# Patient Record
Sex: MALE | Race: WHITE
[De-identification: names, ages, dates, MRNs, and addresses within clinical notes are randomized per-mention and may not be internally consistent; named-entity substitution may affect disease eponyms.]

---

## 2019-04-29 ENCOUNTER — HOSPITAL ENCOUNTER (OUTPATIENT)
Dept: HOSPITAL 95 - ORSCSDS | Age: 79
Discharge: HOME | End: 2019-04-29
Attending: INTERNAL MEDICINE
Payer: MEDICARE

## 2019-04-29 VITALS — WEIGHT: 210.81 LBS | BODY MASS INDEX: 28.55 KG/M2 | HEIGHT: 72.01 IN

## 2019-04-29 DIAGNOSIS — Z79.82: ICD-10-CM

## 2019-04-29 DIAGNOSIS — D12.5: ICD-10-CM

## 2019-04-29 DIAGNOSIS — I10: ICD-10-CM

## 2019-04-29 DIAGNOSIS — G20: ICD-10-CM

## 2019-04-29 DIAGNOSIS — Z87.891: ICD-10-CM

## 2019-04-29 DIAGNOSIS — Z80.0: ICD-10-CM

## 2019-04-29 DIAGNOSIS — K57.30: ICD-10-CM

## 2019-04-29 DIAGNOSIS — Z79.899: ICD-10-CM

## 2019-04-29 DIAGNOSIS — Z86.010: ICD-10-CM

## 2019-04-29 DIAGNOSIS — Z12.11: Primary | ICD-10-CM

## 2019-04-29 PROCEDURE — 0DBH8ZX EXCISION OF CECUM, VIA NATURAL OR ARTIFICIAL OPENING ENDOSCOPIC, DIAGNOSTIC: ICD-10-PCS | Performed by: INTERNAL MEDICINE

## 2019-04-29 PROCEDURE — 0DBN8ZX EXCISION OF SIGMOID COLON, VIA NATURAL OR ARTIFICIAL OPENING ENDOSCOPIC, DIAGNOSTIC: ICD-10-PCS | Performed by: INTERNAL MEDICINE

## 2021-09-21 NOTE — NUR
DOCTOR LESTER NOTIFIED OF GRAM - BACILLI IN BLOOD CX, AND OF REPEAT LACTIC 5.3,
AND REPEAT H&H. CONTINUE CURRENT ANTIBIOTICS AND GIVE ANOTHER LITER BOLUS OF
NS.

## 2021-09-21 NOTE — NUR
SUMMARY
PT INTUBATED AND SEDATED WITH PROPOFOL. PT WILL WITHDRAW FROM NOXIOUS STIMULUS
AND MOVES EXTREMITIES WHEN SEDATION IS DOWN. PT HAS COFFEE GROUND FLUID COMING
FROM OGT. DR. SAWYER CONSULTED AND WAS IN TO SEE PT WITH WIFE AT BEDSIDE. ON
PROTONIX GTT AND SANDOSTATIN WAS D/C'D. ON LEVOPHED AND VASOPRESSIN FOR
BLOOD PRESSURE SUPPORT. ON AMIO GTT. CONVERTED FROM AFIB THIS AFTERNOON. WIFE
UPDATED THIS EVENING BY PALLIATIVE CARE RN.

## 2021-09-22 NOTE — NUR
SUMMARY
 
Neuro: Receiving propofol at 25 mcg/kg/min. Responsive to verbal stimulus.
Cough and gag present. PERRL. Subtle movement noted to all extremities.
 
Musculoskeletal: Mobility limited by cords, lines, tubes, intubation, and
sedation. In bilateral wrist restraints to prevent self-extubation.
 
Respiratory: 8.0 cm ETT remains at 24 cm at teeth placement. Vent settings
spontaneous mode with pressure support 7/5 and 30% FiO2. RR 25. Tidal volumes
400-450 mL. SpO2 94%. No sputum suctioned from ETT this shift.
 
Cardiac: SR per monitor. Still requires 6 mcg/min levophed. No edema.
Capillary refill less than 3 seconds BUE and BLE. 2+ pulses radial, pedal, and
posttibial. Unremarkable heart sounds. Pt has central line to RIJ as well as 3
peripheral IVs.
 
GI: OG tube with feeds and flushes per orders. Normal BT. No signs of abd
tenderness with palpation. Protonix drip stopped today. No signs of GI bleed.
 
: Ambrosio catheter in place with excellent output of elle urine.
 
Skin: Unremarkable.
 
Psychosocial: Unable to assess pt due to intubation and sedation. This RN did
not have contact with pt's family today. Reportedly updated by palliative RNLaly, however.
 
Will continue to closely monitor until care handoff and bedside report with
oncoming RN.

## 2021-09-22 NOTE — NUR
DR. PETERSON NOTIFIED THAT PATIENT CONVERTED BACK TO AFIB WITH RATE 100S TO 130S.
PATIENT ON LEVO GTT FOR BLOOD PRESSURE. NO PO MEDICATION WAS ORDERED AFTER
AMIO GTT WAS STOPPED THIS MORNING. ORDER TO RESTART AMIO GTT AT .5 AND
CONTINUT UNTIL TOLD TO DISCONTINUE.

## 2021-09-22 NOTE — NUR
/CARE COORDINATOR REFERRAL - ADMIT:   9/21/21       DISCHARGE:
          DX: SEPSIS, PNEUMONIA, SEPTIC SHOCK            CC: ELISA
 
COVID STATUS/IMMUNIZATION:  NEGATIVE TEST 9/21/21
MODERNA: 2/17/21; 3/19/21; 8/27/21
 
MAICO CALL: WIFEERNESTO [phone]
RESIDENCE: HOME WITH SPOUSE
CAREGIVER/EMERGENCY CONTACT:
ERNESTO SIMS, SPOUSE / PARTNER, [phone]
BO MILIAN, FAMILY MEMBER, [phone]
DX: HTN, CONSTIPATION, HYPERLIPIDEMIA, PARKINSON'S, SEE LIST
DME: TENS UNIT
CCM: NONE
HOME HEALTH: NONE
SUMMARY: (ADMIT: 9/21/21)
9/22/21- PER CHART REVIEW WITH DR. GREGORY, NO D/C PLAN AT THIS TIME. PT IS
INTUBATED AND ON VENT. PT IS IN WRIST RESTRAINTS TO PREVENT SELF-EXTUBATION.
DR. SAWYER CONSULTED FOR GI BLEED. HE WILL NOT PURSUE ENDOSCOPIC ASSESSMENT AT
THIS TIME BUT WILL WATCH LABS. WIFE WANTS PT TO BE A FULL CODE AND "EVERYTHING
DONE." PT HAD EPISODE OF AFIB LAST NIGHT AND CARDIOVERSION WAS ATTEMPTED. -KJB

## 2021-09-22 NOTE — NUR
Assumed care of pt at 0700 with Viridiana DIANE. Report received from Yvette DIANE.
 
See flow sheet for drip rates and shift assessment for more details.

## 2021-09-22 NOTE — NUR
Ventilator settings now ACVC 18/450/5/30%. SpO2 90% or greater. Propofol
remains at 30 mcg/kg/min. Levophed 6 mcg/min. Vasopressin off.  Amiodarone is
now off. Protonix drip discontinued.

## 2021-09-22 NOTE — NUR
SUMMARY
PATIENT INTUBATED AND SEDATED WITH PROPOFOL 30 MCG. GRIMACE AND COUGH WITH
STIMULI, SLIGHT MOVEMENT SEEN IN BOTH FEET, AND HANDS. BILAT WRIST RESTRAINTS
IN PLACE TO PREVENT ACCIDENTAL SELF EXTUBATION. LUNG SOUNDS DECREASED IN THE
BASES, SCANT AMT OF CLEAR SECRETIONS, ORAL VERY DRY. VENT AC 18, , PEEP
8, FIO2 40%. OG IN PLACE WITH APROX 100 CC OF DARK COFFEE GROUND LOOKING BILE,
AS NIGHT PROGRESSED LOOKING MORE DARK GREEN. PROTONIX DRIP CONTINUES. NEXT H&H
WILL BE DRAWN AT 1000.  HYPOTENSION CONTINUES, VASOPRESSIN NOW OFF AND
LEVOPHED TITRATED DOWN TO 4 MCG. AMIODARONE CONTINUES 0.5 MG/MIN. MONITOR
SHOWING SINUS RHYTHM WITH OCCASIONAL PAC, AND PJC. SLIGHT EDEMA TO LOWER
EXTREMITIES.

## 2021-09-23 NOTE — NUR
Pt has been off propofol since approx 0930. Pt is more responsive to stimulus,
however does not follow commands or open eyes spontaneously. Coughing is rare
and responsive to verbal redirection.

## 2021-09-23 NOTE — NUR
Around 1209, pt's SpO2 dropped from 96% to 79%. This RN suctioned copious
amount of brown/green/black secretions from ETT. Pt did not recover. 100% FiO2
given. TF turned off. Stat chest xray ordered by charge nurse. Reviewed with
Dr Sharp. Pt positioned on right side with HOB elevated in reverse
trendelenberg. Vent settings chaned to ACVC 18/450/10/100%. Pt resedated.
Received 100 mcg fentanyl and propofol started and titrated up to 50
mcg/kg/min. Levophed titrated up to 7 mcg/min. OG tube placed to LIS per order
from Dr Sharp.
 
Pt also had extra large soft bowel movement. Pt cleaned and linens changed.
During turning the patient, he has a small amount of brown drainage from
mouth. Otherwise, no emesis has been observed from patient.
 
Wife at bedside at this time to visit. She received updates from this RN and
from Dr Sharp.

## 2021-09-23 NOTE — NUR
DR. SHAHID NOTIFIED THAT PATIENT HAS R FACIAL DROOP. (PATIENT HAD EVENTFUL DAY
DURING DAYSHIFT WHERE THE ORIGINAL PLAN WAS TO EXTUBATE BUT THEN PATIENT HAD
ASPIRATED. CURRENTLY ON STRICT NPO SO HOLDING ALL PO MEDICATIONS INCLUDING PO
ELIQUIS.) EYEBROWS DO FURROW INWARDS EQUALLY WHEN PATIENT IS IN PAIN.
AND PUPILS BOTH REACT TO LIGHT ALTHOUGH THEY ARE SMALL.
PATIENT SEDATED AND ON LEVO WITH INCREASED RATES DURING DAY SHIFT. DR. SHAHID TO
ORDER HEAD CT WITHOUT CONTRAST.

## 2021-09-23 NOTE — NUR
END OF SHIFT SUMMARY
DECREASE PROPOFOL TO 15
DECREASE LEVO GTT TO 3
STARTED VANCOMYCIN PER ORDER FOR INCREASED WBC
PATIENT ON SPONTANEOUS BREATHING SETTING ALL SHIFT AND DOING WELL
BM X2 (ONE LAST NIGHT AND ONE THIS MORNING) BROWN AND SOFT
HEELS AND BACK RED BUT BLANCHABLE. HEEL PROTECTORS ADDED. BATH GIVEN.
PLATELETES 99
POTASSIUM 3.3, NO REPLACEMENT PROTOCOL ORDERED. BUN AND CRE IMPROVING.
CALCIUM 8.1

## 2021-09-23 NOTE — NUR
Assumed care of pt at 0700. Report received from Ninoska DIANE. Pt on spontaneous
mode on ventilator with PS 7/5 and 30% FiO2. SpO2 90% or greater. Actual RR
22. Tidal volumes 400-600. Propofol at 15 mcg/kg/min. Plan to stop propofol
and assess pt's mentation.

## 2021-09-23 NOTE — NUR
per chart review with Dr. Head, they are going to try and extubate the pt
today and wean him off sedation. -[id number]

## 2021-09-23 NOTE — NUR
SUMMARY
 
Neuro: Receiving propofol at 50 mcg/kg/min. Responsive to verbal stimulus.
Cough and gag present. PERRL. Subtle movement noted to all extremities.
 
Musculoskeletal: Mobility limited by cords, lines, tubes, intubation, and
sedation. In bilateral wrist restraints to prevent self-extubation.
 
Respiratory: 8.0 cm ETT remains at 24 cm at teeth placement. Vent settings
ACVC 18/450/10/70%. actual RR 22. Lungs clear, dim in bases. Large amounts of
green/brown/black secretions suctioned from ETT.
 
Cardiac: SR per monitor. Requires 10 mcg/min levophed. Trace edema BUE and
BLE.  Capillary refill less than 3 seconds BUE and BLE. 2+ pulses radial,
pedal, and posttibial. Unremarkable heart sounds. Pt has central line to RIJ
as well as 2 peripheral IVs.
 
GI: OG tube to LIS. Holding all per tube input for now.  Normal BT. No signs
of abd tenderness with palpation. No signs of GI bleed. No additional events
of vomiting/nausea since noon today.
 
: Ambrosio catheter in place with excellent output of elle urine.
 
Skin: Unremarkable.
 
Psychosocial: Unable to assess pt due to intubation and sedation. Pt's spouse
in to see patient today, recevied update from this RN and from Dr Sharp.
 
Will continue to closely monitor until care handoff and bedside report with
oncoming RN.

## 2021-09-24 NOTE — NUR
END OF SHIFT SUMMARY NOTE:
PATIENT SENT TO CT TO R/O CVA D/T DROOPING OF R SIDE OF MOUTH. (NEG FOR ACUTE)
CARDIZEM GTT OFF FOR HR < 120. AFIB CONTROLLED.
LEVO GTT DECREASED AND DOWN TO 2.
PROPOFOL GTT TITRATED WHEN APPROPRIATE.
TOLERATING VENTILATOR.
DARK/GREEN INLINE SECRETIONS DECREASED THROUGHOUT SHIFT. INLINE SECRETIONS NOW
JACKSON YELLOW AND THICK.

## 2021-09-24 NOTE — NUR
UPDATE
 
Propofol decreased to 25 mcg/min. Pt tolerating vent well. Vent settings
changed by RT. Now ACVC 18/450/5/30%. SpO2 93%. Pt tolerating laying on left
side well.

## 2021-09-24 NOTE — NUR
Assumed care of pt at 0700. Report received from Ninoska DIANE.
 
Drips:
35 mcg/kg/min propofol
2 mcg/min levophed
100 mL/hr NS
 
Vent:
ACVC 18/450/10/30%. SpO2 96%
 
ETT:
8.0 cm, 24 cm at teeth.

## 2021-09-24 NOTE — NUR
Assumed care. Report recieved from dayshift RN. Pt in bed on ventilator. Vent
settings: Spont 8/5, 40%. OG tube in place, connected to low intermittent
suction. Pt has R/IJ central line, R/AC and R/foream IV access. Pump settings:
NS at 100 ml/hr, NS at 10 ml/hr. Ambrosio catheter in place, draining elle
urine. No acute needs noted at this time, will continue to monitor.

## 2021-09-24 NOTE — NUR
SUMMARY
 
Neuro: Propofol off since late this morning. Pt opens eyes to verbal stimulus.
Follows simple commands. Does not move or interact in absence of stimlulation.
Does not attempt to pull at restraints.
 
Musculoskeletal: Mobility limited by cords, lines, tubes, intubation. In
bilateral wrist restraints to prevent self-extubation.
 
Respiratory: 8.0 cm ETT remains at 24 cm at teeth placement. Vent settings
spontaneous mode with pressure support 8/5 and 40% FiO2.  Actual RR 22. Tidal
volumes 500-600 mL. SpO2 90% or greater. Lungs clear, dim in bases. Large
amounts of tan secretions suctioned from ETT.
 
Cardiac: SR per monitor. Levophed off. Trace edema BUE and BLE.  Capillary
refill less than 3 seconds BUE and BLE. 2+ pulses radial, pedal, and
posttibial. Unremarkable heart sounds. Pt has central line to RIJ
as well as 2 peripheral IVs.
 
GI: OG tube to LIS. Holding all per tube input for now.  Normal BT. No signs
of abd tenderness with palpation. No signs of GI bleed. No additional events
of vomiting/nausea since noon yesterday.
 
: Ambrosio catheter in place with excellent output of elle urine.
 
Skin: Unremarkable.
 
Psychosocial: Unable to assess pt due to intubation. Pt's spouse in to see
patient today, recevied update from this RN and from Dr Blanca.
 
Will continue to closely monitor until care handoff and bedside report with
oncoming RN.

## 2021-09-25 NOTE — NUR
Initial [facility] visit made & met with wife per Dr Blanca's request and
order received for second referral for [facility]. PC dept RNs had been
updating wife daily by phone earlier in the week thru this am when I spoke to
Chyna by phone. After Chyna's visit, we met in ICU waiting room. Chyna
expressed disappointment that even though Aploinar has been off of sedation for
the past day, he is not waking up and not able to respond/follow any commands
for her. We reviewed his PLOF and quality of life prior to his episode of N/V
and aspiration that precipitated this admission. Wife states that Parkinson's
has robbed him of much of his joys over the past 3.5 years. He was an active
golfer and fitness was a big part of his life. He loved to tell jokes,
socialize, eat well, all of which he has not been able to do in recent years.
He sometimes does not recognize her and she notes growing signs of dementia
along with the parkinson's neuro deficits. We discussed goals of care and code
status. Chyna welcomed the conversation and asked for input. She states she's
been thinking a lot about this even prior to this acute illness and ICU stay.
She verbalized understanding of why pt is not able to be weaned and extubated
from the ventilator at this time after talking with Dr Blanca. She does not
want CPR performed if he experiences a decline or cardiac arrest. She asked
good questions about comfort care and I gave her reading materials re: comfort
care and advanced care planning. Dr and ICU staff updated on my visit. Chyna
is going to discuss our conversation with her son who is staying at their
house currently and we agreed to talk again tomorrow and see what the next 24
hours brings. VO obtained for DNR and entered. After meeting with wife I
visited pt. He is ventilated, unresponsive to voice or touch. He does not
appear to be in pain or distress.

## 2021-09-25 NOTE — NUR
Shift summary. Pt continues in bed, on ventilator. Vent settings: Spont 8/5,
40% Fi02. OG tube to low intermittent suction. R/IJ central line in place, NS
running at 100 ml/hr. Ambrosio catheter in place, draining elle urine. Pt rested
quietly throughout shift, see shift assessment for details. Will continue to
monitor and report off to dayshift RN.

## 2021-09-25 NOTE — NUR
Brief update provided to wife from EMR. Mrs Gardner plans to come in again
durring designated visiting hours of 1-3 pm today.

## 2021-09-25 NOTE — NUR
Assumed Care. Report recieved from shankar RN. Pt resting quietly in bed.
Ventilator setting: Spont 8/10, 30% Fi02. OG tube in place, running pivot 1.5
at 10 ml/hr. R/IJ central line in place. Maintenance IV fluids DC'd. Ambrosio
catheter in place, draining elle urine. No acute needs noted at this time,
will continue to monitor.

## 2021-09-25 NOTE — NUR
The patient remains on the ventilator with no sedation. Ventilator settings
are SBT mode with a pressure support of 8, 40% FiO2, and PEEP: 10.
 
The patient is in a. fib on the heart monitor and prehypertensive towards the
end of the shift.
 
Tube feeds were restarted at trickle rate of 10mLs per hour. No bowel movement
today.
 
Ambrosio, right IJ central line, and OG tube are intact.
 
The patient intermittently follows commands, though is mostly lethargic. The
patient is not showing signs of distress

## 2021-09-26 NOTE — NUR
Wife, Chyna, returned call. She would like to meet again after her visit
today. If possible she wants to take Apolinar home with hospice support. We
discussed the process and need for pt to be stable with s/s and no risk of
demise in transport. Wife agrees with this. Updated pt's RN, who will page me
when Chyna is ready to meet. I updated Dr Head and VO for hospice on d/c
obtained for tentative d/c planning with that goal. This will depend on how pt
tolerates extubation and 24 hours of comfort care assessment. Wife verbalized
good understanding of this.

## 2021-09-26 NOTE — NUR
Case conference with ICU staff and Garima Head and Rianna this am. T/c to wife,
Chyna and VM left requesting call back to provide an update to her as she
requested. Visit to pt. He does not appear comfortable. He has furrowed brow
and frown, tightly closed eyes at times. His neuro status is unchanged.
Requiring minimal resp support but not awake enough to protect airway, cough,
clear, follow commands, per . This is also unchanged in the past 24 hours.
Will review and discuss goals of care, answer Chyna's questions as I am able
when she comes in to visit or returns my call.

## 2021-09-26 NOTE — NUR
Shift summary. Pt continues in bed on ventilator. Vent settings: Spont 8/10,
30 % Fi02, copious thick secretions suctioned throughout shift. OG tube in
place, tube feed running at 10 ml/hr. R/IJ central line in place. Ambrosio
catheter in place, draining elle urine. Vital signs trending towards
hypetension, order recieved from Dr. Foy for hydralazine PRN. Vitals stable
throughout shift, will continue to monitor and report off to dayshift RN.

## 2021-09-26 NOTE — NUR
The patient remains on the the ventialtor with a setting of 8/10 on
spontaneous mode. All sedation has been off for the past 3 days. The patient
remains lethargic, though follows commands upon awakening from stimulation.
Tube feeds continue to infuse.
Central line, OG tube, and reddy remain intact. ET tube suctioning displays
a large amount of thick, tan secretions

## 2021-09-26 NOTE — NUR
Assumed care. Report recieved by dayshift RN. Patient continues on ventilator
in bed. Vent settings: Spont 8/10, 30% Fi02. OG tube in place, tube feed
running at 10ml/hr. R/IJ central line in place, no fluids or meds infusing at
this time. Ambrosio catheter in place, draining elle urine. No acute needs
noted, will continue to monitor.

## 2021-09-27 NOTE — NUR
Shift summary. Pt continues in bed on ventilator. Pt still A&Ox0 with no
sedation. Ventilator settings: Spont 8/10, 30% Fi02. OG tube in place, tube
feed running at 10 ml/hr. R/IJ central line in place, no fluids/meds infusing.
Ambrosio catheter in place, draining elle urine. Will continue to monitor and
report off to dayshift RN.

## 2021-09-27 NOTE — NUR
Patient is being extubated when I enter rm and family enter shortly after. I
provide anticipatory grief support and end of life prayer. family respond well
and show signs of being comforted.

## 2021-09-27 NOTE — NUR
The patient has been compassionately extubated per family member's request. Mr Gardner is now wearing an oxygen face mask at 14L and 40% FiO2. He is not
showing signs of distress. Reviewed comfort measures orders with the oncoming
night shift nurse.

## 2021-09-27 NOTE — NUR
Bedside visit with family, andre-Yvette and wife-Chyna. They are waiting for
additional family memebers to arrive shortly. Both expressed appreciation for
support and care from [facility]. Time spent listening and learning about pt and
life review between andre and wife. Answered their questions re: hospice plan
and let them know [facility] is working on d/c plan for tomorrow if able.
Later called to let them know Ariela of [facility] would meet with them
around 4:15 today. Discussed comfort care orders with RN and plan of care,
recommendations for medications prior to extubation. Also discussed all of
above with charge RN and Hospice.

## 2021-09-27 NOTE — NUR
PATIENT FAMILY IN ROOM FOR APPROX 1 HOUR THEN LEFT. PATIENT STARTED TO APPEAR
AGITATED AND 02 SATS DROPPED, INCREASED TO 15L 02 VIA MASK AND MEDICATED PER
EMAR. REPOSITIONED PATIENT AND SUCTIONED SECRETIONS FROM MOUTH.

## 2021-09-27 NOTE — NUR
per Dr. Ramesh, pt's family has decided on comfort care and would like the pt
to go home with [facility]. Family flying in today and pt will tentatively
d/c tomorrow.. ([id number], 4:54 PM)

## 2021-09-27 NOTE — NUR
ASSUMED CARE @1900
PATIENT APPEARS TO BE RESTING COMFORTABLY. 02 SATS 95% ON 14L WITH A FACE
MASK. WOLFE DRAINING MARGARITO URINE. HR A.FIB 80s-90s. FAMILY REQUESTED BLOOD
PRESSURE NOT BE TAKEN PER REPORT. PATIENT REPOSITIONED AND MEDICATED PER EMAR
FOR COMOFORT.

## 2021-09-27 NOTE — NUR
Pt visit, case conference with Drs, RT & RN. Plan remains for initiation of
comfort care this afternoon and extubation after family has arrived around
3pm. Orders entered per VO. Plan to be available for support. CM updated
this am on plan for dc home with hospice tomorrow also. Pt remains
unresponsive and off sedation. It is anticipated that he will be able to
breathe on his own after extubation but not be alert enough for PO intake or
protection of airway. Family has decided against feeding tube or cont
ventilatory support.  Wife also requested d/c of IV tx, antibiotics and all tx
other than medications for comfort.

## 2021-09-28 NOTE — NUR
TRANSFER NOTE/SHIFT SUMMARY
HANDOFF RECEIVED FROM ICU RN ANJALI. PT TRANSFERED TO FLOOR VIA GURNEY.
COMFORT CARE IN PLACE. O2 IN PLACE AS ORDERED. RIGHT IJ IV IS IN PLACE,
CHARGE INFORMED. WOLFE IN PLACE. PERSONAL POSSESSIONS WITH PT. PLAN IS FOR DC
HOME W/HOSPICE TODAY

## 2021-09-28 NOTE — NUR
PATIENT APPEARS RELAXED. 02 SATS 91% ON VENTI MASK WITH 15L @45%. MOTH
SUCTIONED. REPOSTIONED. RESTING COMFORTABLY. WOLFE DRAINING MARGARITO URINE